# Patient Record
Sex: FEMALE | Race: ASIAN | NOT HISPANIC OR LATINO | Employment: FULL TIME | URBAN - METROPOLITAN AREA
[De-identification: names, ages, dates, MRNs, and addresses within clinical notes are randomized per-mention and may not be internally consistent; named-entity substitution may affect disease eponyms.]

---

## 2019-05-29 ENCOUNTER — OFFICE VISIT (OUTPATIENT)
Dept: URGENT CARE | Facility: CLINIC | Age: 44
End: 2019-05-29

## 2019-05-29 VITALS
TEMPERATURE: 98 F | SYSTOLIC BLOOD PRESSURE: 126 MMHG | DIASTOLIC BLOOD PRESSURE: 99 MMHG | WEIGHT: 128 LBS | HEIGHT: 63 IN | RESPIRATION RATE: 18 BRPM | BODY MASS INDEX: 22.68 KG/M2 | OXYGEN SATURATION: 98 % | HEART RATE: 89 BPM

## 2019-05-29 DIAGNOSIS — R21 PERIANAL RASH: Primary | ICD-10-CM

## 2019-05-29 PROCEDURE — 87491 CHLMYD TRACH DNA AMP PROBE: CPT

## 2019-05-29 PROCEDURE — 87591 N.GONORRHOEAE DNA AMP PROB: CPT

## 2019-05-29 PROCEDURE — 87529 HSV DNA AMP PROBE: CPT

## 2019-05-29 PROCEDURE — 99203 PR OFFICE/OUTPT VISIT, NEW, LEVL III, 30-44 MIN: ICD-10-PCS | Mod: 25,S$GLB,, | Performed by: SURGERY

## 2019-05-29 PROCEDURE — 96372 PR INJECTION,THERAP/PROPH/DIAG2ST, IM OR SUBCUT: ICD-10-PCS | Mod: S$GLB,,, | Performed by: SURGERY

## 2019-05-29 PROCEDURE — 96372 THER/PROPH/DIAG INJ SC/IM: CPT | Mod: S$GLB,,, | Performed by: SURGERY

## 2019-05-29 PROCEDURE — 99203 OFFICE O/P NEW LOW 30 MIN: CPT | Mod: 25,S$GLB,, | Performed by: SURGERY

## 2019-05-29 RX ORDER — AMOXICILLIN 250 MG
CAPSULE ORAL
COMMUNITY
Start: 2019-05-25

## 2019-05-29 RX ORDER — DOXYCYCLINE 100 MG/1
100 CAPSULE ORAL 2 TIMES DAILY
Qty: 14 CAPSULE | Refills: 0 | Status: SHIPPED | OUTPATIENT
Start: 2019-05-29 | End: 2019-06-05

## 2019-05-29 RX ORDER — LIDOCAINE 50 MG/G
OINTMENT TOPICAL
Qty: 1 TUBE | Refills: 2 | Status: SHIPPED | OUTPATIENT
Start: 2019-05-29

## 2019-05-29 RX ORDER — FLUCONAZOLE 150 MG/1
150 TABLET ORAL
COMMUNITY
Start: 2019-05-25

## 2019-05-29 RX ORDER — CEFTRIAXONE 250 MG/1
500 INJECTION, POWDER, FOR SOLUTION INTRAMUSCULAR; INTRAVENOUS
Status: COMPLETED | OUTPATIENT
Start: 2019-05-29 | End: 2019-05-29

## 2019-05-29 RX ORDER — SULFAMETHOXAZOLE AND TRIMETHOPRIM 800; 160 MG/1; MG/1
TABLET ORAL
COMMUNITY
Start: 2019-05-25

## 2019-05-29 RX ADMIN — CEFTRIAXONE 500 MG: 250 INJECTION, POWDER, FOR SOLUTION INTRAMUSCULAR; INTRAVENOUS at 02:05

## 2019-05-29 NOTE — PROGRESS NOTES
"Subjective:       Patient ID: Lizy Phillips is a 44 y.o. female.    Vitals:  height is 5' 3" (1.6 m) and weight is 58.1 kg (128 lb). Her oral temperature is 97.7 °F (36.5 °C). Her blood pressure is 126/99 (abnormal) and her pulse is 89. Her respiration is 18 and oxygen saturation is 98%.     Chief Complaint: Rash    She went to the Emergency room in [place] the day before she arrived here. (3 days ago).  Gave her ointment ( compound nitroglycerin,clindagel, hydroquinone)  Antibiotics. Ointment made it hurt worse, so she stopped using. Now it spread and feels much worse. She leaves Saturday to go back home. She has pain sitting and swollen inguinal lymph nodes. She completed several days diflucan and is still on TID bactrim with no imporvement.     Rash   This is a new problem. The current episode started 1 to 4 weeks ago (10 days). The problem has been gradually worsening since onset. Location: rectum. The rash is characterized by blistering, pain, redness and burning. It is unknown if there was an exposure to a precipitant. Pertinent negatives include no cough, fever or sore throat. Past treatments include antibiotics, anti-itch cream and antibiotic cream. The treatment provided no relief.       Constitution: Negative for chills and fever.   HENT: Negative for facial swelling and sore throat.    Neck: Negative for painful lymph nodes.   Eyes: Negative for eye itching and eyelid swelling.   Respiratory: Negative for cough.    Musculoskeletal: Negative for joint pain and joint swelling.   Skin: Positive for rash. Negative for color change, pale, wound, abrasion, laceration, lesion, skin thickening/induration, puncture wound, erythema, bruising, abscess, avulsion and hives.   Allergic/Immunologic: Negative for environmental allergies, immunocompromised state and hives.   Hematologic/Lymphatic: Negative for swollen lymph nodes.       Objective:      Physical Exam   Constitutional: She is oriented to person, place, " and time. She appears well-developed and well-nourished.   HENT:   Head: Normocephalic and atraumatic.   Right Ear: External ear normal.   Left Ear: External ear normal.   Nose: Nose normal.   Eyes: Lids are normal.   Neck: Trachea normal, normal range of motion and phonation normal. Neck supple.   Cardiovascular: Normal pulses.   Pulmonary/Chest: Effort normal.   Abdominal: Soft. Normal appearance and bowel sounds are normal. She exhibits no distension. There is no tenderness. There is no CVA tenderness.   Genitourinary:         Neurological: She is alert and oriented to person, place, and time.   Skin: Skin is warm, dry and intact. No erythema.   Psychiatric: She has a normal mood and affect. Her speech is normal and behavior is normal. Cognition and memory are normal.   Nursing note and vitals reviewed.      Assessment:       1. Perianal rash        Plan:         Perianal rash  -     cefTRIAXone injection 500 mg  -     doxycycline (VIBRAMYCIN) 100 MG Cap; Take 1 capsule (100 mg total) by mouth 2 (two) times daily. for 7 days  Dispense: 14 capsule; Refill: 0  -     lidocaine (XYLOCAINE) 5 % Oint ointment; Apply topically as needed.  Dispense: 1 Tube; Refill: 2  -     HSV by Rapid PCR, Non-Blood [facility]; Vaginal/Rectal  -     N.gonorroheae, Miscellaneous site, (AMP RNA) Rectal  -     C. trachomatis, Miscellaneous site, (AMP RNA) Rectal    stop bactrim.   Patient Instructions     Zinc Oxide cream, ointment, paste  What is this medicine?  ZINC OXIDE (zingk OX josemanuel) is used to treat or prevent minor skin irritations such as burns, cuts, and diaper rash. Some products may be used as a sunscreen.  How should I use this medicine?  This medicine is for external use only. Do not take by mouth. Follow the directions on the prescription or product label. Wash your hands before and after use. Apply a generous amount to the affected area. Do not cover with a bandage or dressing unless your doctor or health care professional  tells you to. Do not get this medicine in your eyes. If you do, rinse out with plenty of cool tap water.  Talk to your pediatrician regarding the use of this medicine in children. While this drug may be prescribed for selected conditions, precautions do apply.  What side effects may I notice from receiving this medicine?  There have been no side effects reported this medicine. If you experience any unusual effects while using zinc oxide, contact your doctor or health care professional right away.  What may interact with this medicine?  Interactions are not expected. Do not use other skin products at the same site without asking your doctor or health care professional.  What if I miss a dose?  If you miss a dose, use it as soon as you can. If it is almost time for your next dose, use only that dose. Do not use double or extra doses.  Where should I keep my medicine?  Keep out of reach of children.  Store at room temperature. Keep closed while not in use. Throw away an unused medicine after the expiration date.  What should I tell my health care provider before I take this medicine?  They need to know if you have any of these conditions:  · an unusual or allergic reaction to zinc oxide, other medicines, foods, dyes, or preservatives  · pregnant or trying to get pregnant  · breast-feeding  What should I watch for while using this medicine?  Tell your doctor or health care professional if the area you are treating does not get better within a week.  NOTE:This sheet is a summary. It may not cover all possible information. If you have questions about this medicine, talk to your doctor, pharmacist, or health care provider. Copyright© 2017 [employer]

## 2019-05-29 NOTE — PATIENT INSTRUCTIONS
Zinc Oxide cream, ointment, paste  What is this medicine?  ZINC OXIDE (zingk OX josemanuel) is used to treat or prevent minor skin irritations such as burns, cuts, and diaper rash. Some products may be used as a sunscreen.  How should I use this medicine?  This medicine is for external use only. Do not take by mouth. Follow the directions on the prescription or product label. Wash your hands before and after use. Apply a generous amount to the affected area. Do not cover with a bandage or dressing unless your doctor or health care professional tells you to. Do not get this medicine in your eyes. If you do, rinse out with plenty of cool tap water.  Talk to your pediatrician regarding the use of this medicine in children. While this drug may be prescribed for selected conditions, precautions do apply.  What side effects may I notice from receiving this medicine?  There have been no side effects reported this medicine. If you experience any unusual effects while using zinc oxide, contact your doctor or health care professional right away.  What may interact with this medicine?  Interactions are not expected. Do not use other skin products at the same site without asking your doctor or health care professional.  What if I miss a dose?  If you miss a dose, use it as soon as you can. If it is almost time for your next dose, use only that dose. Do not use double or extra doses.  Where should I keep my medicine?  Keep out of reach of children.  Store at room temperature. Keep closed while not in use. Throw away an unused medicine after the expiration date.  What should I tell my health care provider before I take this medicine?  They need to know if you have any of these conditions:  · an unusual or allergic reaction to zinc oxide, other medicines, foods, dyes, or preservatives  · pregnant or trying to get pregnant  · breast-feeding  What should I watch for while using this medicine?  Tell your doctor or health care professional  [Takes medication as prescribed] : takes if the area you are treating does not get better within a week.  NOTE:This sheet is a summary. It may not cover all possible information. If you have questions about this medicine, talk to your doctor, pharmacist, or health care provider. Copyright© 2017 [employer]

## 2019-05-30 LAB
HSV1 DNA SPEC QL NAA+PROBE: NEGATIVE
HSV2 DNA SPEC QL NAA+PROBE: POSITIVE
SPECIMEN SOURCE: ABNORMAL

## 2019-05-31 ENCOUNTER — TELEPHONE (OUTPATIENT)
Dept: URGENT CARE | Facility: CLINIC | Age: 44
End: 2019-05-31

## 2019-05-31 DIAGNOSIS — B00.9 HSV-2 (HERPES SIMPLEX VIRUS 2) INFECTION: Primary | ICD-10-CM

## 2019-05-31 LAB
C TRACH RRNA SPEC QL NAA+PROBE: NEGATIVE
C.TRACH RNA SOURCE: NORMAL
N GONORRHOEA RRNA SPEC QL NAA+PROBE: NEGATIVE
N.GONORROHEAE, AMP RNA SOURCE: NORMAL

## 2019-05-31 RX ORDER — VALACYCLOVIR HYDROCHLORIDE 1 G/1
1000 TABLET, FILM COATED ORAL EVERY 12 HOURS
Qty: 20 TABLET | Refills: 0 | Status: SHIPPED | OUTPATIENT
Start: 2019-05-31 | End: 2019-06-10